# Patient Record
(demographics unavailable — no encounter records)

---

## 2024-12-17 NOTE — PHYSICAL EXAM
[No Acute Distress] : no acute distress [Obese] : obese [No Carotid Bruit] : no carotid bruit [Normal S1, S2] : normal S1, S2 [Clear Lung Fields] : clear lung fields [No Murmur] : no murmur [No Respiratory Distress] : no respiratory distress  [Normal Gait] : normal gait [No Edema] : no edema [Moves all extremities] : moves all extremities [Normal Speech] : normal speech [Alert and Oriented] : alert and oriented [Normal memory] : normal memory

## 2024-12-17 NOTE — DISCUSSION/SUMMARY
[FreeTextEntry1] : 60F h/o asthma, obesity (BMI 35), FERNANDO/CPAP, HTN, HLD, DM2, chest pain with prior negative left heart cath 2027, bilateral knee replacement, chronic joints pain, incidental trace pericardial effusion had Echo and carotid done unremarkable previously follows with cardiologist Dr. Juarez last seen 9/2023, recently presented to [facility] on 10/31/23 with nausea/vomiting found COVID+ and CT ab/pel done probable enteritis, serial Troponin negative and repeat Echo done normal EF with normal ESR 13 and CRP 10, chronic midsternal chest pain, presents for cardiology evaluation seen on 12/2023 suspect noncardiac related, added H2-blocker and seen by GI had EGD done found with solid food contents in stomach body and added Reglan for gastroparesis, returned for follow up 4/2024 and pending L-rotator cuff surgery, now presents for routine follow up of HTN.   Overall stable no new cardiac complains, repeat EKG unchanged from prior. BP stable. Pt states BP good at home as well. Chronic nonexertional midsternal chest pain suspect musculoskeletal pain vs. costochondritis. Prior Echo and cath in 2017 normal. Previous visit discussed- noncardiac chest pain from MSK vs. compounded by chronic GERD and now with gastroparesis. Reassured that mild valvular regurgitation and trace pericardial effusion are physiologic normal findings.   1. Chronic chest pain - likely MSK as mentioned above  2. GERD/gastroparesis- on PP1 and H2 blocker with Reglan, EKG with stable QTc, follow up with GI.   3. DM2- on oral agent, currently on low dose statin, need LDL goal <70. Labs done with PCP per patient.   4. HTN- BP at goal on amlodipine  Follow up in 6 months or sooner if new cardiac issue arise.  Patient was advised to contact the office or seek emergency medical care for any new, worsening or concerning symptoms. Patient verbalized understanding and is in agreement with the above plan.  Marie Hughes was present in office at the time of visit. [EKG obtained to assist in diagnosis and management of assessed problem(s)] : EKG obtained to assist in diagnosis and management of assessed problem(s)

## 2024-12-17 NOTE — CARDIOLOGY SUMMARY
[de-identified] : 12/5/23- Sinus with T-wave inversion aVL and V3 4/2/24- Sinus 87, leftward axis, no ST abnormality, QTc 430 12/16/2024: NSR, left axis, no ST/T changes, consistent with prior EKG. [de-identified] : 11/1/23- Summary:  1. Left ventricular ejection fraction, by visual estimation, is 70 to  75%.  2. Normal global left ventricular systolic function.  3. Spectral Doppler shows pseudonormal pattern of left ventricular  myocardial filling (Grade II diastolic dysfunction).  4. Normal right ventricular size and function.  5. Trace mitral valve regurgitation.  6. Mild tricuspid regurgitation. 2/2020-  LV EF 55-60%, trace effusion 3/2022- LV EF 65%, trivial effusion  11/1/23-  Summary:  1. Left ventricular ejection fraction, by visual estimation, is 70 to  75%.  2. Normal global left ventricular systolic function.  3. Spectral Doppler shows pseudonormal pattern of left ventricular  myocardial filling (Grade II diastolic dysfunction).  4. Normal right ventricular size and function.  5. Trace mitral valve regurgitation.  6. Mild tricuspid regurgitation. [de-identified] : outside hospital 2017- minor luminal irregularities  [de-identified] : 3/2022- mild plaques at the L-ICA

## 2024-12-17 NOTE — HISTORY OF PRESENT ILLNESS
[FreeTextEntry1] : 60F h/o asthma, obesity (BMI 35), FERNANDO/CPAP, HTN, HLD, DM2, chest pain with prior negative left heart cath 2027, bilateral knee replacement, chronic joints pain, incidental trace pericardial effusion had Echo and carotid done unremarkable previously follows with cardiologist Dr. Juarez last seen 9/2023, recently presented to [facility] on 10/31/23 with nausea/vomiting found COVID+ and CT ab/pel done probable enteritis, serial Troponin negative and repeat Echo done normal EF with normal ESR 13 and CRP 10, chronic midsternal chest pain, presents for cardiology evaluation seen on 12/2023 suspect noncardiac related, added H2-blocker and seen by GI had EGD done found with solid food contents in stomach body and added Reglan for gastroparesis, returned for follow up 4/2024 and pending L-rotator cuff surgery, now presents for routine follow up of HTN.   Reports chronic intermittent mid chest "soreness" which has been ongoing for years, no changes. Reports dyspnea after "doing a lot of activity." States she is very active around her home and will do chores and other things most of the day, by the end will feel tired and need to catch her breath a bit. States respiratory treatments help when she feels SOB after activity. Reports rare palpitations described as skipping when she is upset. Denies dizziness, lightheadedness, syncope. Admits to smoking marijuana. Denies smoking cigarettes, vaping or cigars. Denies excessive alcohol use. She cannot exercise due to chronic generalized pain in her shoulders, back and legs.   Prior visit 4/2/2024: No cardiac complains, denies shortness of breath or chest pain with exertion, no routine exercise but active with chores and work, she has been off Ozempic due to worsening the gastroparesis.    Prior visit 12/2023: Reports chronic midsternal chest pain >6 years mainly at rest nonexertional, had normal cath in the past in 2017, seen by GI for acid reflux, takes omeprazole daily with chronic use of hyoscyamine last seen GI in July recollect had EGD procedure in the past does not remeber the actual result; pending to see new provider, but then midsternal chest pain recurred almost daily tender and reproducible on palpations, tried Tylenol without much relief, takes Percocet sometimes for joints pain. Has chronic joints and back pain, has not seen rheumatology in the past.   Former smoker for 8yrs quit in 2010 +marijuana social alcohol No CAD or stroke in family

## 2024-12-17 NOTE — REVIEW OF SYSTEMS
[Feeling Fatigued] : not feeling fatigued [SOB] : no shortness of breath [Dyspnea on exertion] : dyspnea during exertion [Chest Discomfort] : chest discomfort [Lower Ext Edema] : no extremity edema [Palpitations] : palpitations [Syncope] : no syncope [Dizziness] : no dizziness [FreeTextEntry5] : occasional LE edema, none presently

## 2025-02-12 NOTE — HISTORY OF PRESENT ILLNESS
[Former] : former [CPAP:] : CPAP [Full Face mask] : full face mask [TextBox_4] : Dx with FERNANDO about 2016 at [facility]. On CPAP since. On 14 cmH2O.  Current machine 12/13/22. DME is [facility].  Dry mouth better. Took it to DME and it was adjusted. She likes the [employer] FFM.     Also hx of asthma. She reports she has had asthma for the past >10 years. Never hospitalized for asthma. On breo. Using it every day.  Does not like it; does not like the dry powder. On albuterol neb or MDI; neb broke. Never hospitalized for asthma.   She has chronic SOB she says for most of her life. No change. She usually has more problems in the winter.   Josh today 2/12/25: mild obstruction; did not take breo today.  Son passed away spiring 2024.  Sees Dr Farias for cardio.   She has chronic pain issues in her back, stomach. This causes breathing to be more labored.  Had EGD. Dx with gastroparesis.   Stopped smoking in 2003. Smokes marijuana.  [YearQuit] : 2003 [TextBox_131] : 14 [TextBox_137] : 90 [TextBox_147] : 0.7 [TextBox_160] : [employer] Medium

## 2025-02-12 NOTE — PROCEDURE
[FreeTextEntry1] : jayson today 2/12/25: mild obstruction; worse than previous  compliance reviewed  PFT done  3/20/24: no obstruction, mild restriction, DLCO diminished  cxr done 2/19/24: no acute dz  FENO 1/24/24: 8 ppb

## 2025-02-12 NOTE — PLAN
[TextEntry] : Miky. Will see if we can get an MDI covered. Rinse mouth after use. Albuterol prn MDI or neb. [place] next visit. Ordered new neb machine.  Continue CPAP.  Weight loss essential. Cardio f/u.

## 2025-02-12 NOTE — ASSESSMENT
[FreeTextEntry1] : Hx asthma. Chronic VIZCARRA for many years. Cardiac w/u done. Lungs clear. O2 sat WNL. On LABA/ICS.  Obesity contributing. EFRNANDO on cpap. The patient is using CPAP well, is compliant with it's use and getting clinical benefit. The patient should continue to use CPAP.

## 2025-02-12 NOTE — END OF VISIT
[Time Spent: ___ minutes] : I have spent [unfilled] minutes of time on the encounter which excludes teaching and separately reported services. Bipolar disorder Bipolar disorder

## 2025-03-10 NOTE — ASSESSMENT
[FreeTextEntry1] : 60 year old female with chronic GERD and gastroparesis presenting for a follow up visit. Currently taking Reglan 10 m 1-2 times a week with good results. Due for repeat EGD with biopsies. Will schedule procedure. I have discussed the indications (including but not limited to ruling out William's esophagus, AVM's, H. pylori, and malignancies), benefits, risks (including but not limited to reaction to the anesthesia, infection, bleeding, missed lesions, and perforation), and alternatives to an endoscopy. The patient understands all options and has agreed to endoscopy and is medically optimized for the planned procedure.   Nausea, vomiting, and diarrhea x 2 days with associated fatigue and abdominal tenderness. Will order stool studies to rule out infection.  C Diff, GI PCR, O&P, Stool Culture  Obtain recent labs from PCP for review

## 2025-03-10 NOTE — REASON FOR VISIT
[Follow-up] : a follow-up of an existing diagnosis [FreeTextEntry1] : nausea, vomiting, diarrhea, gastroparesis

## 2025-03-10 NOTE — HISTORY OF PRESENT ILLNESS
[FreeTextEntry1] : WISAM COOMBS is a 60 year old female with PMH of DM, gastroparesis, HTN, HLD, CAD, FERNANDO, presenting today for a follow up visit. Diagnosed with gastroparesis by Dr. Hidalgo on EGD in 2023 and started on Reglan 10 MG TID PRN. She reports that she only takes the Reglan 1-2 times a week because it causes diarrhea. She continues to take Omeprazole 40 mg QD for her chronic GERD as well. Symptoms are well controlled. EGD in January 2024 showed a food filled stomach and a repeat was recommended in 1 year.  She reports that for the last 2 days she has been having nausea, vomiting, diarrhea, and fatigue, and believes she may have a stomach virus. Denies any recent travel or sick contacts. Denies rectal bleeding, fecal urgency.

## 2025-03-10 NOTE — PHYSICAL EXAM
[Alert] : alert [Normal Voice/Communication] : normal voice/communication [Healthy Appearing] : healthy appearing [No Acute Distress] : no acute distress [Sclera] : the sclera and conjunctiva were normal [Hearing Threshold Finger Rub Not Winkler] : hearing was normal [Normal Lips/Gums] : the lips and gums were normal [Oropharynx] : the oropharynx was normal [Normal Appearance] : the appearance of the neck was normal [No Neck Mass] : no neck mass was observed [No Respiratory Distress] : no respiratory distress [No Acc Muscle Use] : no accessory muscle use [Respiration, Rhythm And Depth] : normal respiratory rhythm and effort [Auscultation Breath Sounds / Voice Sounds] : lungs were clear to auscultation bilaterally [Heart Rate And Rhythm] : heart rate was normal and rhythm regular [Normal S1, S2] : normal S1 and S2 [Murmurs] : no murmurs [Bowel Sounds] : normal bowel sounds [No Masses] : no abdominal mass palpated [Abdomen Soft] : soft [] : no hepatosplenomegaly [Diffuse] : diffusely [Oriented To Time, Place, And Person] : oriented to person, place, and time

## 2025-03-24 NOTE — ASSESSMENT
[FreeTextEntry1] : A/P gerd, gastroparesis   I discussed the risks and benefits of EGD and patient was given opportunity to ask questions. EGD to r/o William's  esophagus, PUD, mass, AVM'S. Pt is medically optimized for EGD

## 2025-06-11 NOTE — CARDIOLOGY SUMMARY
[de-identified] : 12/5/23- Sinus with T-wave inversion aVL and V3 4/2/24- Sinus 87, leftward axis, no ST abnormality, QTc 430 12/16/2024: NSR, left axis, no ST/T changes, consistent with prior EKG. 6/11/25- Sinus 93, left axis, no ST abnormality, QTc 454 [de-identified] : 11/1/23- Summary:  1. Left ventricular ejection fraction, by visual estimation, is 70 to  75%.  2. Normal global left ventricular systolic function.  3. Spectral Doppler shows pseudonormal pattern of left ventricular  myocardial filling (Grade II diastolic dysfunction).  4. Normal right ventricular size and function.  5. Trace mitral valve regurgitation.  6. Mild tricuspid regurgitation. 2/2020-  LV EF 55-60%, trace effusion 3/2022- LV EF 65%, trivial effusion  11/1/23-  Summary:  1. Left ventricular ejection fraction, by visual estimation, is 70 to  75%.  2. Normal global left ventricular systolic function.  3. Spectral Doppler shows pseudonormal pattern of left ventricular  myocardial filling (Grade II diastolic dysfunction).  4. Normal right ventricular size and function.  5. Trace mitral valve regurgitation.  6. Mild tricuspid regurgitation. [de-identified] : outside hospital 2017- minor luminal irregularities  [de-identified] : 3/2022- mild plaques at the L-ICA

## 2025-06-11 NOTE — PHYSICAL EXAM
[Well Developed] : well developed [Well Nourished] : well nourished [No Acute Distress] : no acute distress [Normal Conjunctiva] : normal conjunctiva [Obese] : obese [No Carotid Bruit] : no carotid bruit [Normal Venous Pressure] : normal venous pressure [Normal S1, S2] : normal S1, S2 [No Murmur] : no murmur [No Rub] : no rub [No Gallop] : no gallop [Clear Lung Fields] : clear lung fields [Good Air Entry] : good air entry [No Respiratory Distress] : no respiratory distress  [Soft] : abdomen soft [Non Tender] : non-tender [No Masses/organomegaly] : no masses/organomegaly [Normal Gait] : normal gait [Normal Bowel Sounds] : normal bowel sounds [No Edema] : no edema [No Cyanosis] : no cyanosis [No Clubbing] : no clubbing [No Varicosities] : no varicosities [No Rash] : no rash [No Skin Lesions] : no skin lesions [No Focal Deficits] : no focal deficits [Moves all extremities] : moves all extremities [Normal Speech] : normal speech [Alert and Oriented] : alert and oriented [Normal memory] : normal memory [de-identified] : +tenderness L-chest wall

## 2025-06-11 NOTE — DISCUSSION/SUMMARY
[FreeTextEntry1] : 60F h/o asthma, obesity (BMI 36), FERNANDO/CPAP, HTN, HLD, DM2, chest pain with prior negative left heart cath 2017, bilateral knee replacement, chronic joints pain, incidental trace pericardial effusion had Echo and carotid done unremarkable previously follows with cardiologist Dr. Juarez last seen 9/2023, recently presented to [facility] on 10/31/23 with nausea/vomiting found COVID+ and CT ab/pel done probable enteritis, serial Troponin negative and repeat Echo done normal EF with normal ESR 13 and CRP 10, chronic midsternal chest pain, presents for cardiology evaluation seen on 12/2023 suspect noncardiac related, added H2-blocker and seen by GI had EGD done found with solid food contents in stomach body and added Reglan for gastroparesis, returned for follow up 4/2024 and pending L-rotator cuff surgery, last seen 12/2024, interval seen by GI for GERD/gastroparesis had EGD done in 3/2025 and since off PPI use but remains on H2-blocker, now presents for follow up.  Chronic nonexertional midsternal chest wall pain suspect musculoskeletal pain vs. costochondritis, unable to use NSAIDs with chronic GERD, repeat EKG no ischemic abnormality, but given prior Echo and cath in 2017 normal and intermediate risk factors for CAD will obtain cardiac CTA to exclude CAD etiology of recurrent chest pain.    1. Chronic chest pain - likely MSK as mentioned above but will obtain CCTA to exclude CAD   2. GERD/gastroparesis- off PP1 and on H2 blocker with Reglan, EKG with stable QTc, follow up with GI.   3. DM2- on oral agent, currently on low dose statin, need LDL goal <70. Labs done with PCP per patient.   4. HTN- BP at goal on amlodipine  5. Obesity- need dieting for weight loss, unable to exercise with chronic body pain  6. Chronic pain, neuropathy- follows with pain management and referral for neurology eval.    Follow up in 6 months or sooner if new cardiac issue arise.   [EKG obtained to assist in diagnosis and management of assessed problem(s)] : EKG obtained to assist in diagnosis and management of assessed problem(s)

## 2025-06-11 NOTE — HISTORY OF PRESENT ILLNESS
[FreeTextEntry1] : 60F h/o asthma, obesity (BMI 36), FERNANDO/CPAP, HTN, HLD, DM2, chest pain with prior negative left heart cath 2017, bilateral knee replacement, chronic joints pain, incidental trace pericardial effusion had Echo and carotid done unremarkable previously follows with cardiologist Dr. Juarez last seen 9/2023, recently presented to [facility] on 10/31/23 with nausea/vomiting found COVID+ and CT ab/pel done probable enteritis, serial Troponin negative and repeat Echo done normal EF with normal ESR 13 and CRP 10, chronic midsternal chest pain, presents for cardiology evaluation seen on 12/2023 suspect noncardiac related, added H2-blocker and seen by GI had EGD done found with solid food contents in stomach body and added Reglan for gastroparesis, returned for follow up 4/2024 and pending L-rotator cuff surgery, last seen 12/2024, interval seen by GI for GERD/gastroparesis had EGD done in 3/2025 and since off PPI use but remains on H2-blocker, now presents for follow up.  Reports noted recurrent nonexertional L-sided chest pain under her breast for few months now started prior to off PPI use, L-chest wall tender on palpitations, takes Tylenol 650mg, using lidocaine patch at times, she does not like to use oxycodone, follows with pain management was advised to see a neurologist for neuropathy.    Prior visit 12/2024:  Reports chronic intermittent mid chest "soreness" which has been ongoing for years, no changes. Reports dyspnea after "doing a lot of activity." States she is very active around her home and will do chores and other things most of the day, by the end will feel tired and need to catch her breath a bit. States respiratory treatments help when she feels SOB after activity. Reports rare palpitations described as skipping when she is upset. Denies dizziness, lightheadedness, syncope. Admits to smoking marijuana. Denies smoking cigarettes, vaping or cigars. Denies excessive alcohol use. She cannot exercise due to chronic generalized pain in her shoulders, back and legs.   Prior visit 4/2/2024: No cardiac complains, denies shortness of breath or chest pain with exertion, no routine exercise but active with chores and work, she has been off Ozempic due to worsening the gastroparesis.    Prior visit 12/2023: Reports chronic midsternal chest pain >6 years mainly at rest nonexertional, had normal cath in the past in 2017, seen by GI for acid reflux, takes omeprazole daily with chronic use of hyoscyamine last seen GI in July recollect had EGD procedure in the past does not remeber the actual result; pending to see new provider, but then midsternal chest pain recurred almost daily tender and reproducible on palpations, tried Tylenol without much relief, takes Percocet sometimes for joints pain. Has chronic joints and back pain, has not seen rheumatology in the past.   Former smoker for 8yrs quit in 2010 +marijuana social alcohol No CAD or stroke in family

## 2025-07-22 NOTE — ASSESSMENT
[FreeTextEntry1] : 60 year old female with chronic GERD and diabetic gastroparesis presenting for a follow up visit. She is s/p EGD in March that was unremarkable. She was out of Omeprazole for about a month after the EGD and symptoms were exacerbated. Restarted on Omeprazole 40 mg QD and Sucralfate 1 G TID by PCP with good relief.   Omeprazole 40 mg QD renewed, continue current dose Famotidine 20 mg BID PRN for breakthrough symptoms Okay to discontinue Sucralfate  GERD diet reviewed Gastroparesis diet handout provided and reviewed   Follow up in 6 months
Initial (On Arrival)

## 2025-07-22 NOTE — HISTORY OF PRESENT ILLNESS
[FreeTextEntry1] : WISAM COOMBS is a 60 year old female with PMH of DM, gastroparesis, HTN, HLD, CAD, FERNANDO, presenting today for a follow up visit. Diagnosed with gastroparesis by Dr. Hidalgo on EGD in 2023 and started on Reglan 10 MG TID PRN. She reports that she initially only took the Reglan 1-2 times a week because it causes diarrhea however she has now fully stopped it due to concern of side effects. EGD in January 2024 showed a food filled stomach and a repeat was recommended in 1 year. Repeat EGD was performed in March of this year with Dr. Murphy and was unremarkable. She reports that she ran out of the Omeprazole a few months ago and her heartburn and bloating worsened. Her PCP gave her a month supply of Omeprazole 40 mg QD and Sucralfate 1 G TID and she has noted improvement. She was previously on Famotidine 20 mg BID PRN but states that it was no longer covered by insurance.   Reports most recent A1C was 6.

## 2025-07-22 NOTE — PHYSICAL EXAM
